# Patient Record
Sex: MALE | Race: BLACK OR AFRICAN AMERICAN | NOT HISPANIC OR LATINO | Employment: UNEMPLOYED | URBAN - METROPOLITAN AREA
[De-identification: names, ages, dates, MRNs, and addresses within clinical notes are randomized per-mention and may not be internally consistent; named-entity substitution may affect disease eponyms.]

---

## 2018-07-11 ENCOUNTER — HOSPITAL ENCOUNTER (EMERGENCY)
Facility: HOSPITAL | Age: 58
Discharge: HOME | End: 2018-07-11
Attending: EMERGENCY MEDICINE | Admitting: EMERGENCY MEDICINE
Payer: MEDICARE

## 2018-07-11 VITALS
TEMPERATURE: 98.1 F | DIASTOLIC BLOOD PRESSURE: 80 MMHG | RESPIRATION RATE: 18 BRPM | WEIGHT: 145.2 LBS | HEART RATE: 92 BPM | OXYGEN SATURATION: 99 % | SYSTOLIC BLOOD PRESSURE: 120 MMHG

## 2018-07-11 DIAGNOSIS — F10.10 ALCOHOL ABUSE: Primary | ICD-10-CM

## 2018-07-11 LAB
ALBUMIN SERPL BCP-MCNC: 3.5 G/DL (ref 3.5–5)
ALP SERPL-CCNC: 140 U/L (ref 46–116)
ALT SERPL W P-5'-P-CCNC: 214 U/L (ref 12–78)
AMPHETAMINES SERPL QL SCN: NEGATIVE
ANION GAP SERPL CALCULATED.3IONS-SCNC: 9 MMOL/L (ref 4–13)
AST SERPL W P-5'-P-CCNC: 381 U/L (ref 5–45)
ATRIAL RATE: 82 BPM
BARBITURATES UR QL: NEGATIVE
BASOPHILS # BLD AUTO: 0.01 THOUSANDS/ΜL (ref 0–0.1)
BASOPHILS NFR BLD AUTO: 0 % (ref 0–1)
BENZODIAZ UR QL: NEGATIVE
BILIRUB SERPL-MCNC: 1.2 MG/DL (ref 0.2–1)
BUN SERPL-MCNC: 23 MG/DL (ref 5–25)
CALCIUM SERPL-MCNC: 9 MG/DL (ref 8.3–10.1)
CHLORIDE SERPL-SCNC: 102 MMOL/L (ref 100–108)
CO2 SERPL-SCNC: 26 MMOL/L (ref 21–32)
COCAINE UR QL: NEGATIVE
CREAT SERPL-MCNC: 1.72 MG/DL (ref 0.6–1.3)
EOSINOPHIL # BLD AUTO: 0.08 THOUSAND/ΜL (ref 0–0.61)
EOSINOPHIL NFR BLD AUTO: 2 % (ref 0–6)
ERYTHROCYTE [DISTWIDTH] IN BLOOD BY AUTOMATED COUNT: 21.5 % (ref 11.6–15.1)
GFR SERPL CREATININE-BSD FRML MDRD: 50 ML/MIN/1.73SQ M
GLUCOSE SERPL-MCNC: 103 MG/DL (ref 65–140)
HCT VFR BLD AUTO: 32.3 % (ref 36.5–49.3)
HGB BLD-MCNC: 10.9 G/DL (ref 12–17)
LYMPHOCYTES # BLD AUTO: 2.79 THOUSANDS/ΜL (ref 0.6–4.47)
LYMPHOCYTES NFR BLD AUTO: 54 % (ref 14–44)
MAGNESIUM SERPL-MCNC: 2 MG/DL (ref 1.6–2.6)
MCH RBC QN AUTO: 29.7 PG (ref 26.8–34.3)
MCHC RBC AUTO-ENTMCNC: 33.7 G/DL (ref 31.4–37.4)
MCV RBC AUTO: 88 FL (ref 82–98)
METHADONE UR QL: NEGATIVE
MONOCYTES # BLD AUTO: 0.39 THOUSAND/ΜL (ref 0.17–1.22)
MONOCYTES NFR BLD AUTO: 8 % (ref 4–12)
NEUTROPHILS # BLD AUTO: 1.88 THOUSANDS/ΜL (ref 1.85–7.62)
NEUTS SEG NFR BLD AUTO: 36 % (ref 43–75)
NRBC BLD AUTO-RTO: 1 /100 WBCS
OPIATES UR QL SCN: NEGATIVE
P AXIS: 68 DEGREES
PCP UR QL: NEGATIVE
PHOSPHATE SERPL-MCNC: 2 MG/DL (ref 2.7–4.5)
PLATELET # BLD AUTO: 172 THOUSANDS/UL (ref 149–390)
PMV BLD AUTO: 9.3 FL (ref 8.9–12.7)
POTASSIUM SERPL-SCNC: 3.1 MMOL/L (ref 3.5–5.3)
PR INTERVAL: 172 MS
PROT SERPL-MCNC: 7.1 G/DL (ref 6.4–8.2)
QRS AXIS: 44 DEGREES
QRSD INTERVAL: 84 MS
QT INTERVAL: 366 MS
QTC INTERVAL: 427 MS
RBC # BLD AUTO: 3.67 MILLION/UL (ref 3.88–5.62)
SODIUM SERPL-SCNC: 137 MMOL/L (ref 136–145)
T WAVE AXIS: 67 DEGREES
THC UR QL: NEGATIVE
TROPONIN I SERPL-MCNC: <0.02 NG/ML
VENTRICULAR RATE: 82 BPM
WBC # BLD AUTO: 5.15 THOUSAND/UL (ref 4.31–10.16)

## 2018-07-11 PROCEDURE — 84484 ASSAY OF TROPONIN QUANT: CPT | Performed by: EMERGENCY MEDICINE

## 2018-07-11 PROCEDURE — 83735 ASSAY OF MAGNESIUM: CPT | Performed by: EMERGENCY MEDICINE

## 2018-07-11 PROCEDURE — 93010 ELECTROCARDIOGRAM REPORT: CPT | Performed by: INTERNAL MEDICINE

## 2018-07-11 PROCEDURE — 80053 COMPREHEN METABOLIC PANEL: CPT | Performed by: EMERGENCY MEDICINE

## 2018-07-11 PROCEDURE — 85025 COMPLETE CBC W/AUTO DIFF WBC: CPT | Performed by: EMERGENCY MEDICINE

## 2018-07-11 PROCEDURE — 80307 DRUG TEST PRSMV CHEM ANLYZR: CPT | Performed by: EMERGENCY MEDICINE

## 2018-07-11 PROCEDURE — 96360 HYDRATION IV INFUSION INIT: CPT

## 2018-07-11 PROCEDURE — 84100 ASSAY OF PHOSPHORUS: CPT | Performed by: EMERGENCY MEDICINE

## 2018-07-11 PROCEDURE — 36415 COLL VENOUS BLD VENIPUNCTURE: CPT | Performed by: EMERGENCY MEDICINE

## 2018-07-11 PROCEDURE — 93005 ELECTROCARDIOGRAM TRACING: CPT

## 2018-07-11 PROCEDURE — 99284 EMERGENCY DEPT VISIT MOD MDM: CPT

## 2018-07-11 PROCEDURE — 96361 HYDRATE IV INFUSION ADD-ON: CPT

## 2018-07-11 RX ORDER — MAGNESIUM GLUCONATE 30 MG(550)
30 TABLET ORAL
COMMUNITY
End: 2018-07-11 | Stop reason: ALTCHOICE

## 2018-07-11 RX ORDER — NAPROXEN 500 MG/1
TABLET ORAL
COMMUNITY
End: 2018-07-11 | Stop reason: ALTCHOICE

## 2018-07-11 RX ORDER — AMITRIPTYLINE HYDROCHLORIDE 50 MG/1
50 TABLET, FILM COATED ORAL
COMMUNITY
End: 2018-07-11 | Stop reason: ALTCHOICE

## 2018-07-11 RX ORDER — LOSARTAN POTASSIUM 100 MG/1
100 TABLET ORAL
COMMUNITY

## 2018-07-11 RX ORDER — TIZANIDINE HYDROCHLORIDE 4 MG/1
4 CAPSULE, GELATIN COATED ORAL
COMMUNITY
End: 2018-07-11 | Stop reason: ALTCHOICE

## 2018-07-11 RX ORDER — FOLIC ACID 1 MG/1
TABLET ORAL
COMMUNITY
End: 2018-07-11 | Stop reason: ALTCHOICE

## 2018-07-11 RX ORDER — CHLORTHALIDONE 25 MG/1
TABLET ORAL
COMMUNITY
End: 2018-07-11 | Stop reason: ALTCHOICE

## 2018-07-11 RX ORDER — GABAPENTIN 800 MG/1
800 TABLET ORAL
COMMUNITY

## 2018-07-11 RX ORDER — OMEPRAZOLE 20 MG/1
20 CAPSULE, DELAYED RELEASE ORAL DAILY
COMMUNITY

## 2018-07-11 RX ORDER — HYDROCODONE BITARTRATE AND ACETAMINOPHEN 5; 325 MG/1; MG/1
TABLET ORAL EVERY 8 HOURS
COMMUNITY
End: 2018-07-11 | Stop reason: ALTCHOICE

## 2018-07-11 RX ORDER — AMLODIPINE BESYLATE 10 MG/1
TABLET ORAL
COMMUNITY

## 2018-07-11 RX ORDER — DIPHENOXYLATE HYDROCHLORIDE AND ATROPINE SULFATE 2.5; .025 MG/1; MG/1
1 TABLET ORAL
COMMUNITY
Start: 2018-06-02 | End: 2018-07-11 | Stop reason: ALTCHOICE

## 2018-07-11 RX ORDER — LIDOCAINE 50 MG/G
PATCH TOPICAL
COMMUNITY
End: 2018-07-11 | Stop reason: ALTCHOICE

## 2018-07-11 RX ORDER — GABAPENTIN 600 MG/1
600 TABLET ORAL 2 TIMES DAILY
COMMUNITY

## 2018-07-11 RX ORDER — BACLOFEN 10 MG/1
TABLET ORAL
COMMUNITY
End: 2018-07-11 | Stop reason: ALTCHOICE

## 2018-07-11 RX ADMIN — DIBASIC SODIUM PHOSPHATE, MONOBASIC POTASSIUM PHOSPHATE AND MONOBASIC SODIUM PHOSPHATE 1 TABLET: 852; 155; 130 TABLET ORAL at 22:25

## 2018-07-11 RX ADMIN — SODIUM CHLORIDE 1000 ML: 0.9 INJECTION, SOLUTION INTRAVENOUS at 19:34

## 2018-07-11 NOTE — ED NOTES
Patient is resting comfortably on stretcher family at bedside       Mackenzie Braden RN  07/11/18 [id number]

## 2018-07-11 NOTE — ED NOTES
BAT  43 [facility], RN  07/11/18 425 [address] [place] [address], RN  07/11/18 9361

## 2018-07-11 NOTE — ED PROVIDER NOTES
History  Chief Complaint   Patient presents with    Drug / Alcohol Assessment     per patient he is trying to quit drinking would like detox  pt relapsed on fathers day and has been drinking a handle of vodka every day  last drink 45min pta  pt also c/o dizziness, poor appetite and heart burn  denies si/hi/ah/vh      62year old male with alcohol abuse hx presents for evaluation of seeking detox  Patient reports he has been drinking approximately 2 bottles of vodka daily for the last month  He tried to cut down today and drank less than 1 bottle but states he feels very lightheaded and nauseous  No chest pain, SOB, vomiting, fevers or abdominal pain  No history of withdrawal seizures  Denies SI/HI/AVH  Prior to Admission Medications   Prescriptions Last Dose Informant Patient Reported? Taking? amLODIPine (NORVASC) 10 mg tablet   Yes Yes   Sig: amlodipine 10 mg tablet   gabapentin (NEURONTIN) 600 MG tablet   Yes Yes   Sig: Take 600 mg by mouth 2 (two) times a day   gabapentin (NEURONTIN) 800 mg tablet   Yes No   Si mg daily at bedtime     losartan (COZAAR) 100 MG tablet   Yes Yes   Sig: Take 100 mg by mouth   omeprazole (PRILOSEC) 20 mg delayed release capsule   Yes Yes   Sig: Take 20 mg by mouth daily      Facility-Administered Medications: None       Past Medical History:   Diagnosis Date    Hypertension        Past Surgical History:   Procedure Laterality Date    ABDOMINAL SURGERY      perforated ulcer       History reviewed  No pertinent family history  I have reviewed and agree with the history as documented  Social History   Substance Use Topics    Smoking status: Current Every Day Smoker     Packs/day: 0 75    Smokeless tobacco: Never Used    Alcohol use Yes      Comment: 1 handle of vodka daily         Review of Systems   Constitutional: Negative for chills, diaphoresis and fever  HENT: Negative for congestion and rhinorrhea  Eyes: Negative for pain and visual disturbance  Respiratory: Negative for cough, shortness of breath and wheezing  Cardiovascular: Negative for chest pain and leg swelling  Gastrointestinal: Positive for nausea  Negative for abdominal pain, diarrhea and vomiting  Genitourinary: Negative for difficulty urinating, dysuria, frequency and urgency  Musculoskeletal: Negative for back pain and neck pain  Skin: Negative for color change and rash  Neurological: Positive for light-headedness  Negative for syncope, numbness and headaches  All other systems reviewed and are negative  Physical Exam  ED Triage Vitals [07/11/18 1834]   Temperature Pulse Respirations Blood Pressure SpO2   98 1 °F (36 7 °C) 92 18 120/80 99 %      Temp Source Heart Rate Source Patient Position - Orthostatic VS BP Location FiO2 (%)   Oral Monitor Sitting Left arm --      Pain Score       4           Orthostatic Vital Signs  Vitals:    07/11/18 1834   BP: 120/80   Pulse: 92   Patient Position - Orthostatic VS: Sitting       Physical Exam   Constitutional: He is oriented to person, place, and time  He appears well-developed and well-nourished  No distress  HENT:   Head: Normocephalic and atraumatic  Eyes: Conjunctivae and EOM are normal    Neck: Normal range of motion  Neck supple  Cardiovascular: Normal rate and regular rhythm  Pulmonary/Chest: Effort normal and breath sounds normal  No respiratory distress  He has no wheezes  He has no rales  Abdominal: Soft  Bowel sounds are normal  There is no tenderness  There is no guarding  Musculoskeletal: Normal range of motion  He exhibits no edema or tenderness  Neurological: He is alert and oriented to person, place, and time  No cranial nerve deficit  Skin: Skin is warm  He is not diaphoretic  No erythema  Psychiatric: He has a normal mood and affect  His behavior is normal    Nursing note and vitals reviewed        ED Medications  Medications   sodium chloride 0 9 % bolus 1,000 mL (0 mL Intravenous Stopped 7/11/18 2224)   potassium-sodium phosphateS (K-PHOS,PHOSPHA 250) -250 mg tablet 1 tablet (1 tablet Oral Given 7/11/18 2225)       Diagnostic Studies  Results Reviewed     Procedure Component Value Units Date/Time    Rapid drug screen, urine [[id number]]  (Normal) Collected:  07/11/18 2015    Lab Status:  Final result Specimen:  Urine from Urine, Clean Catch Updated:  07/11/18 2035     Amph/Meth UR Negative     Barbiturate Ur Negative     Benzodiazepine Urine Negative     Cocaine Urine Negative     Methadone Urine Negative     Opiate Urine Negative     PCP Ur Negative     THC Urine Negative    Narrative:         FOR MEDICAL PURPOSES ONLY  IF CONFIRMATION NEEDED PLEASE CONTACT THE LAB WITHIN 5 DAYS  Drug Screen Cutoff Levels:  AMPHETAMINE/METHAMPHETAMINES  1000 ng/mL  BARBITURATES     200 ng/mL  BENZODIAZEPINES     200 ng/mL  COCAINE      300 ng/mL  METHADONE      300 ng/mL  OPIATES      300 ng/mL  PHENCYCLIDINE     25 ng/mL  THC       50 ng/mL    Comprehensive metabolic panel [[id number]]  (Abnormal) Collected:  07/11/18 1933    Lab Status:  Final result Specimen:  Blood from Arm, Right Updated:  07/11/18 2022     Sodium 137 mmol/L      Potassium 3 1 (L) mmol/L      Chloride 102 mmol/L      CO2 26 mmol/L      Anion Gap 9 mmol/L      BUN 23 mg/dL      Creatinine 1 72 (H) mg/dL      Glucose 103 mg/dL      Calcium 9 0 mg/dL       (H) U/L       (H) U/L      Alkaline Phosphatase 140 (H) U/L      Total Protein 7 1 g/dL      Albumin 3 5 g/dL      Total Bilirubin 1 20 (H) mg/dL      eGFR 50 ml/min/1 73sq m     Narrative:         National Kidney Disease Education Program recommendations are as follows:  GFR calculation is accurate only with a steady state creatinine  Chronic Kidney disease less than 60 ml/min/1 73 sq  meters  Kidney failure less than 15 ml/min/1 73 sq  meters      Magnesium [[id number]]  (Normal) Collected:  07/11/18 1933    Lab Status:  Final result Specimen:  Blood from Arm, Right Updated: 07/11/18 2002     Magnesium 2 0 mg/dL     Phosphorus [[id number]]  (Abnormal) Collected:  07/11/18 1933    Lab Status:  Final result Specimen:  Blood from Arm, Right Updated:  07/11/18 2002     Phosphorus 2 0 (L) mg/dL     Troponin I [[id number]]  (Normal) Collected:  07/11/18 1933    Lab Status:  Final result Specimen:  Blood from Arm, Right Updated:  07/11/18 2000     Troponin I <0 02 ng/mL     CBC and differential [[id number]]  (Abnormal) Resulted:  07/11/18 1946    Lab Status:  Final result Specimen:  Blood Updated:  07/11/18 1946     WBC 5 15 Thousand/uL      RBC 3 67 (L) Million/uL      Hemoglobin 10 9 (L) g/dL      Hematocrit 32 3 (L) %      MCV 88 fL      MCH 29 7 pg      MCHC 33 7 g/dL      RDW 21 5 (H) %      MPV 9 3 fL      Platelets 466 Thousands/uL      nRBC 1 /100 WBCs      Neutrophils Relative 36 (L) %      Lymphocytes Relative 54 (H) %      Monocytes Relative 8 %      Eosinophils Relative 2 %      Basophils Relative 0 %      Neutrophils Absolute 1 88 Thousands/µL      Lymphocytes Absolute 2 79 Thousands/µL      Monocytes Absolute 0 39 Thousand/µL      Eosinophils Absolute 0 08 Thousand/µL      Basophils Absolute 0 01 Thousands/µL                  No orders to display         Procedures  Procedures      Phone Consults  ED Phone Contact    ED Course  ED Course as of Jul 11 2247 Wed Jul 11, 2018 2224 Patient refused                                 MDM  Number of Diagnoses or Management Options  Alcohol abuse:   Diagnosis management comments: 62year old male presenting for alcohol detox  BAT, UDS, medical work up including ekg, trop, labs, administer fluids  Consult crisis/HOST  When HOST arrived, patient refused detox treatment  Will discharge       CritCare Time    Disposition  Final diagnoses:   Alcohol abuse     Time reflects when diagnosis was documented in both MDM as applicable and the Disposition within this note     Time User Action Codes Description Comment    7/11/2018 10:11 PM Rick Jeffrey Add [F10 10] Alcohol abuse       ED Disposition     ED Disposition Condition Comment    Discharge  Karine Dutta discharge to home/self care  Condition at discharge: Stable        Follow-up Information     Follow up With Specialties Details Why Contact Info Additional [address] [facility]  As needed [address] [place] [address]  [id number] 190 [facility] Emergency Department Emergency Medicine  If symptoms worsen [address] [facility]  [phone] [place] ED, [address] [facility] [address]  , [place], [place], [address]          Patient's Medications   Discharge Prescriptions    No medications on file     No discharge procedures on file  ED Provider  Attending physically available and evaluated Karine Dutta I managed the patient along with the ED Attending      Electronically Signed by         Tommy Romero DO  07/11/18 5559

## 2018-07-12 NOTE — ED ATTENDING ATTESTATION
Bryn Kumari MD, saw and evaluated the patient  I have discussed the patient with the resident/non-physician practitioner and agree with the resident's/non-physician practitioner's findings, Plan of Care, and MDM as documented in the resident's/non-physician practitioner's note, except where noted  All available labs and Radiology studies were reviewed  At this point I agree with the current assessment done in the Emergency Department  I have conducted an independent evaluation of this patient a history and physical is as follows:    80-year-old male history of alcoholism presents for evaluation of alcohol withdrawal requesting rehab  Patient complains of poor appetite and heartburn  Denies suicidal or homicidal thoughts, chest pain, shortness breath, cough UR symptoms, dizziness  Ten systems reviewed otherwise negative  On exam no acute distress, lungs normal cardiac normal, abdomen normal, psych normal  Medical decision making;-indigestion, alcohol abuse-will check EKG, labs, IV fluids, treat symptoms reassess    Rehab consult    Critical Care Time  CritCare Time    Procedures

## 2018-07-12 NOTE — ED NOTES
Pt received a call from [address] [facility]) who informed me that she is on her way in to assess pt      [address] [place] [address]

## 2018-07-12 NOTE — DISCHARGE INSTRUCTIONS
Abuse of Alcohol   WHAT YOU SHOULD KNOW:   Alcohol abuse is when you drink large amounts of alcohol often to change your mood or behavior  CARE AGREEMENT:   You have the right to help plan your care  Learn about your health condition and how it may be treated  Discuss treatment options with your caregivers to decide what care you want to receive  You always have the right to refuse treatment  RISKS:   Medicines to treat alcohol abuse may cause vomiting, stress, anxiety, headaches, or dizziness  Alcohol abuse puts you at risk for disease and injury  Alcohol can damage your brain, heart, kidneys, lungs, and liver  The risk of stroke is greater if you have 5 or more drinks each day  You may act out violently when you abuse alcohol  You may harm yourself and others  Risky sexual behavior could lead to sexually transmitted infections  If you are pregnant and drink alcohol, you and your baby are at risk for serious health problems  Alcohol abuse may put you in a coma and may be life-threatening  WHILE YOU ARE HERE:   Informed consent  is a legal document that explains the tests, treatments, or procedures that you may need  Informed consent means you understand what will be done and can make decisions about what you want  You give your permission when you sign the consent form  You can have someone sign this form for you if you are not able to sign it  You have the right to understand your medical care in words you know  Before you sign the consent form, understand the risks and benefits of what will be done  Make sure all your questions are answered  Psychiatric assessment:  Caregivers will ask if you have a history of psychological trauma, such as physical, sexual, or mental abuse  They will ask if you were given the care that you needed  Caregivers will ask you if you have been a victim of a crime or natural disaster, or if you have a serious injury or disease   They will ask you if you have seen other people being harmed, such as in combat  You will be asked if you drink alcohol or use drugs at present or in the past  Caregivers will ask you if you want to hurt or kill yourself or others  How you answer these questions can help caregivers decide on treatment  To help during treatment, caregivers will ask you about such things as how you feel about it and your hobbies and goals  Caregivers will also ask you about the people in your life who support you  Pulse oximeter: A pulse oximeter is a device that measures the amount of oxygen in your blood  A cord with a clip or sticky strip is placed on your finger, ear, or toe  The other end of the cord is hooked to a machine  Never turn the pulse oximeter or alarm off  An alarm will sound if your oxygen level is low or cannot be read  Vital signs:  Caregivers will check your blood pressure, heart rate, breathing rate, and temperature  They will also ask about your pain  These vital signs give caregivers information about your current health  Intake and Output:  Healthcare providers will keep track of the amount of liquid you are getting  They may also need to know how much you are urinating  Ask your healthcare provider how much liquid you should have each day  You may need to increase or decrease the amount of liquid you have each day  Ask healthcare providers if they need to measure or collect your urine before you dispose of it  An IV  is a small tube placed in your vein that is used to give you medicine or liquids  Medicines:   · Sedative: This medicine is given to help you stay calm and relaxed  · Anticonvulsant medicine: This medicine is given to control seizures  Take this medicine exactly as directed  · Antinausea medicine: This medicine may be given to calm your stomach and prevent vomiting  · Glucose: This medicine may be given to increase the amount of sugar in your blood       · Vitamin supplement:  Alcohol can make it hard for your body to absorb enough vitamin B1  You may be given vitamin B1 if you have low levels  It is also given to prevent alcohol related brain damage  You may also need other vitamin supplements  Tests:   · Blood and urine tests:  Samples of your blood or urine are tested for alcohol  Tests can also show signs of liver, kidney, or heart damage caused by alcohol  You may need to have these tests more than once  · Neurologic exam:  This is also called neuro signs, neuro checks, or neuro status  A neurologic exam can show caregivers how well your brain works after an injury or illness  Caregivers will check how your pupils (black dots in the center of each eye) react to light  They may check your memory and how easily you wake up  Your hand grasp and balance may also be tested  · EKG: This test records the electrical activity of your heart  It will be used to check for damage or problems caused by alcohol  · Chest x-ray: This is a picture of your lungs and heart  Healthcare providers may use the x-ray to look for heart damage, injuries, or signs of infection, such as pneumonia  · CT scan: This test is also called a CAT scan  An x-ray machine uses a computer to take pictures of your brain  The pictures may show damage caused by alcohol abuse  You may be given a dye before the pictures are taken to help healthcare providers see the pictures better  Tell the healthcare provider if you have ever had an allergic reaction to contrast dye  Treatment:   · Brief intervention therapy:  A healthcare provider meets with you to discuss ways to control your risky behaviors, such as drinking and driving  This therapy also helps you set goals to decrease the amount of alcohol you drink  · Breathing support:      ¨ You may need extra oxygen  if your blood oxygen level is lower than it should be  You may get oxygen through a mask placed over your nose and mouth or through small tubes placed in your nostrils   Ask your healthcare provider before you take off the mask or oxygen tubing  ¨ A ventilator  is a machine that gives you oxygen and breathes for you when you cannot breathe well on your own  An endotracheal (ET) tube is put into your mouth or nose and attached to the ventilator  You may need a trach if an ET tube cannot be placed  A trach is a tube put through an incision and into your windpipe  © 2014 [employer] is for End User's use only and may not be sold, redistributed or otherwise used for commercial purposes  All illustrations and images included in CareNotes® are the copyrighted property of A D A M [employer]  or M[employer] Leger  The above information is an  only  It is not intended as medical advice for individual conditions or treatments  Talk to your doctor, nurse or pharmacist before following any medical regimen to see if it is safe and effective for you

## 2019-03-28 ENCOUNTER — TRANSCRIBE ORDERS (OUTPATIENT)
Dept: ADMINISTRATIVE | Facility: HOSPITAL | Age: 59
End: 2019-03-28

## 2019-03-28 DIAGNOSIS — IMO0002 LUMP: Primary | ICD-10-CM

## 2019-04-01 ENCOUNTER — TRANSCRIBE ORDERS (OUTPATIENT)
Dept: ADMINISTRATIVE | Facility: HOSPITAL | Age: 59
End: 2019-04-01

## 2019-04-01 ENCOUNTER — HOSPITAL ENCOUNTER (OUTPATIENT)
Dept: ULTRASOUND IMAGING | Facility: HOSPITAL | Age: 59
Discharge: HOME | End: 2019-04-01
Payer: OTHER MISCELLANEOUS

## 2019-04-01 DIAGNOSIS — IMO0002 LUMP: ICD-10-CM

## 2019-04-01 PROCEDURE — 76705 ECHO EXAM OF ABDOMEN: CPT

## 2019-04-02 ENCOUNTER — DOCTOR'S OFFICE (OUTPATIENT)
Dept: URBAN - METROPOLITAN AREA CLINIC 136 | Facility: CLINIC | Age: 59
Setting detail: OPHTHALMOLOGY
End: 2019-04-02
Payer: MEDICAID

## 2019-04-02 DIAGNOSIS — H25.013: ICD-10-CM

## 2019-04-02 DIAGNOSIS — H40.013: ICD-10-CM

## 2019-04-02 PROCEDURE — 92004 COMPRE OPH EXAM NEW PT 1/>: CPT | Performed by: OPHTHALMOLOGY

## 2019-04-02 ASSESSMENT — REFRACTION_AUTOREFRACTION
OD_CYLINDER: -1.00
OD_SPHERE: +1.00
OS_SPHERE: +2.25
OS_CYLINDER: -0.75
OS_AXIS: 017
OD_AXIS: 091

## 2019-04-02 ASSESSMENT — REFRACTION_CURRENTRX
OD_CYLINDER: -1.25
OS_AXIS: 087
OD_OVR_VA: 20/
OD_AXIS: 094
OD_OVR_VA: 20/
OS_ADD: +2.25
OD_SPHERE: +0.75
OD_ADD: +2.25
OD_VPRISM_DIRECTION: BF
OS_SPHERE: PLANO
OS_CYLINDER: -1.50
OS_VPRISM_DIRECTION: BF
OD_OVR_VA: 20/
OS_OVR_VA: 20/

## 2019-04-02 ASSESSMENT — REFRACTION_MANIFEST
OU_VA: 20/
OU_VA: 20/
OS_VA3: 20/
OS_VA3: 20/
OS_VA2: 20/
OD_VA2: 20/
OD_VA3: 20/
OD_VA1: 20/
OS_VA2: 20/
OD_VA1: 20/
OD_VA2: 20/
OS_VA1: 20/
OS_VA1: 20/
OD_VA3: 20/

## 2019-04-02 ASSESSMENT — CONFRONTATIONAL VISUAL FIELD TEST (CVF)
OS_FINDINGS: FULL
OD_FINDINGS: FULL

## 2019-04-02 ASSESSMENT — SPHEQUIV_DERIVED
OD_SPHEQUIV: 0.5
OS_SPHEQUIV: 1.875

## 2019-04-02 ASSESSMENT — VISUAL ACUITY
OS_BCVA: 20/25
OD_BCVA: 20/150

## 2019-04-19 ENCOUNTER — DOCTOR'S OFFICE (OUTPATIENT)
Dept: URBAN - METROPOLITAN AREA CLINIC 136 | Facility: CLINIC | Age: 59
Setting detail: OPHTHALMOLOGY
End: 2019-04-19
Payer: MEDICAID

## 2019-04-19 DIAGNOSIS — H25.011: ICD-10-CM

## 2019-04-19 PROCEDURE — 76519 ECHO EXAM OF EYE: CPT | Performed by: OPHTHALMOLOGY

## 2019-05-23 ENCOUNTER — AMBUL SURGICAL CARE (OUTPATIENT)
Dept: URBAN - METROPOLITAN AREA SURGERY 32 | Facility: SURGERY | Age: 59
Setting detail: OPHTHALMOLOGY
End: 2019-05-23
Payer: MEDICAID

## 2019-05-23 DIAGNOSIS — H25.12: ICD-10-CM

## 2019-05-23 DIAGNOSIS — H25.012: ICD-10-CM

## 2019-05-23 PROCEDURE — 66984 XCAPSL CTRC RMVL W/O ECP: CPT | Performed by: OPHTHALMOLOGY

## 2019-05-23 PROCEDURE — G8907 PT DOC NO EVENTS ON DISCHARG: HCPCS | Performed by: OPHTHALMOLOGY

## 2019-05-23 PROCEDURE — G8918 PT W/O PREOP ORDER IV AB PRO: HCPCS | Performed by: OPHTHALMOLOGY

## 2019-05-24 ENCOUNTER — RX ONLY (RX ONLY)
Age: 59
End: 2019-05-24

## 2019-05-24 ENCOUNTER — DOCTOR'S OFFICE (OUTPATIENT)
Dept: URBAN - METROPOLITAN AREA CLINIC 136 | Facility: CLINIC | Age: 59
Setting detail: OPHTHALMOLOGY
End: 2019-05-24
Payer: MEDICAID

## 2019-05-24 DIAGNOSIS — H27.8: ICD-10-CM

## 2019-05-24 DIAGNOSIS — H27.9: ICD-10-CM

## 2019-05-24 PROCEDURE — 99024 POSTOP FOLLOW-UP VISIT: CPT | Performed by: OPTOMETRIST

## 2019-05-24 ASSESSMENT — REFRACTION_CURRENTRX
OS_CYLINDER: -1.50
OD_OVR_VA: 20/
OD_CYLINDER: -1.25
OD_OVR_VA: 20/
OS_VPRISM_DIRECTION: BF
OS_OVR_VA: 20/
OS_SPHERE: PLANO
OD_ADD: +2.25
OD_AXIS: 094
OS_AXIS: 087
OS_OVR_VA: 20/
OD_SPHERE: +0.75
OS_ADD: +2.25
OD_VPRISM_DIRECTION: BF
OS_OVR_VA: 20/
OD_OVR_VA: 20/

## 2019-05-24 ASSESSMENT — REFRACTION_MANIFEST
OU_VA: 20/
OS_VA3: 20/
OS_VA2: 20/
OS_VA2: 20/
OD_VA3: 20/
OU_VA: 20/
OS_VA1: 20/
OS_VA3: 20/
OD_VA1: 20/
OD_VA1: 20/
OD_VA2: 20/
OD_VA2: 20/
OD_VA3: 20/
OS_VA1: 20/

## 2019-05-24 ASSESSMENT — VISUAL ACUITY
OS_BCVA: 20/25
OD_BCVA: 20/25-2

## 2019-05-24 ASSESSMENT — REFRACTION_AUTOREFRACTION
OD_CYLINDER: -1.00
OS_CYLINDER: -0.75
OS_AXIS: 017
OS_SPHERE: +2.25
OD_AXIS: 091
OD_SPHERE: +1.00

## 2019-05-24 ASSESSMENT — SPHEQUIV_DERIVED
OS_SPHEQUIV: 1.875
OD_SPHEQUIV: 0.5

## 2019-05-30 ENCOUNTER — DOCTOR'S OFFICE (OUTPATIENT)
Dept: URBAN - METROPOLITAN AREA CLINIC 136 | Facility: CLINIC | Age: 59
Setting detail: OPHTHALMOLOGY
End: 2019-05-30

## 2019-05-30 ENCOUNTER — RX ONLY (RX ONLY)
Age: 59
End: 2019-05-30

## 2019-05-30 ENCOUNTER — DOCTOR'S OFFICE (OUTPATIENT)
Dept: URBAN - METROPOLITAN AREA CLINIC 136 | Facility: CLINIC | Age: 59
Setting detail: OPHTHALMOLOGY
End: 2019-05-30
Payer: MEDICAID

## 2019-05-30 DIAGNOSIS — H25.012: ICD-10-CM

## 2019-05-30 DIAGNOSIS — H27.9: ICD-10-CM

## 2019-05-30 DIAGNOSIS — H27.8: ICD-10-CM

## 2019-05-30 PROCEDURE — 99024 POSTOP FOLLOW-UP VISIT: CPT | Performed by: OPTOMETRIST

## 2019-05-30 PROCEDURE — 92136 OPHTHALMIC BIOMETRY: CPT | Performed by: OPHTHALMOLOGY

## 2019-05-30 ASSESSMENT — REFRACTION_MANIFEST
OD_VA1: 20/
OD_VA3: 20/
OD_VA2: 20/
OD_VA3: 20/
OU_VA: 20/
OD_VA2: 20/
OD_VA1: 20/
OS_VA1: 20/
OU_VA: 20/
OS_VA3: 20/
OS_VA1: 20/
OS_VA2: 20/
OS_VA3: 20/
OS_VA2: 20/

## 2019-05-30 ASSESSMENT — REFRACTION_CURRENTRX
OD_AXIS: 094
OS_AXIS: 087
OS_OVR_VA: 20/
OS_OVR_VA: 20/
OD_OVR_VA: 20/
OD_CYLINDER: -1.25
OD_ADD: +2.25
OS_VPRISM_DIRECTION: BF
OS_SPHERE: PLANO
OD_SPHERE: +0.75
OS_ADD: +2.25
OD_OVR_VA: 20/
OD_OVR_VA: 20/
OD_VPRISM_DIRECTION: BF
OS_OVR_VA: 20/
OS_CYLINDER: -1.50

## 2019-05-30 ASSESSMENT — SPHEQUIV_DERIVED
OS_SPHEQUIV: 0.25
OD_SPHEQUIV: 0.5

## 2019-05-30 ASSESSMENT — VISUAL ACUITY
OD_BCVA: 20/20
OS_BCVA: 20/25

## 2019-05-30 ASSESSMENT — REFRACTION_AUTOREFRACTION
OD_AXIS: 091
OD_CYLINDER: -1.00
OD_SPHERE: +1.00
OS_CYLINDER: -0.50
OS_AXIS: 012
OS_SPHERE: +0.50

## 2019-06-10 ENCOUNTER — AMBUL SURGICAL CARE (OUTPATIENT)
Dept: URBAN - METROPOLITAN AREA SURGERY 32 | Facility: SURGERY | Age: 59
Setting detail: OPHTHALMOLOGY
End: 2019-06-10
Payer: MEDICAID

## 2019-06-10 DIAGNOSIS — H25.011: ICD-10-CM

## 2019-06-10 DIAGNOSIS — H25.11: ICD-10-CM

## 2019-06-10 PROCEDURE — G8918 PT W/O PREOP ORDER IV AB PRO: HCPCS | Performed by: OPHTHALMOLOGY

## 2019-06-10 PROCEDURE — G8907 PT DOC NO EVENTS ON DISCHARG: HCPCS | Performed by: OPHTHALMOLOGY

## 2019-06-10 PROCEDURE — 66984 XCAPSL CTRC RMVL W/O ECP: CPT | Performed by: OPHTHALMOLOGY

## 2019-06-11 ENCOUNTER — DOCTOR'S OFFICE (OUTPATIENT)
Dept: URBAN - METROPOLITAN AREA CLINIC 136 | Facility: CLINIC | Age: 59
Setting detail: OPHTHALMOLOGY
End: 2019-06-11

## 2019-06-11 DIAGNOSIS — H27.8: ICD-10-CM

## 2019-06-11 DIAGNOSIS — H27.9: ICD-10-CM

## 2019-06-11 PROBLEM — H25.011 CATARACT; RIGHT EYE: Status: RESOLVED | Noted: 2019-05-24 | Resolved: 2019-06-11

## 2019-06-11 PROCEDURE — 99024 POSTOP FOLLOW-UP VISIT: CPT | Performed by: OPTOMETRIST

## 2019-06-11 ASSESSMENT — REFRACTION_CURRENTRX
OS_OVR_VA: 20/
OS_ADD: +2.25
OD_ADD: +2.25
OS_CYLINDER: -1.50
OD_AXIS: 094
OD_CYLINDER: -1.25
OS_SPHERE: PLANO
OD_SPHERE: +0.75
OS_OVR_VA: 20/
OS_VPRISM_DIRECTION: BF
OS_OVR_VA: 20/
OD_OVR_VA: 20/
OS_AXIS: 087
OD_VPRISM_DIRECTION: BF
OD_OVR_VA: 20/
OD_OVR_VA: 20/

## 2019-06-11 ASSESSMENT — SPHEQUIV_DERIVED: OS_SPHEQUIV: -0.25

## 2019-06-11 ASSESSMENT — REFRACTION_MANIFEST
OD_VA1: 20/
OS_VA2: 20/
OS_VA2: 20/
OD_VA1: 20/
OS_VA1: 20/
OD_VA3: 20/
OS_VA3: 20/
OU_VA: 20/
OS_VA1: 20/
OD_VA2: 20/
OD_VA2: 20/
OU_VA: 20/
OS_VA3: 20/
OD_VA3: 20/

## 2019-06-11 ASSESSMENT — REFRACTION_AUTOREFRACTION
OS_CYLINDER: -1.50
OS_AXIS: 175
OD_SPHERE: +0.50
OD_CYLINDER: SPHERE
OS_SPHERE: +0.50

## 2019-06-11 ASSESSMENT — VISUAL ACUITY
OD_BCVA: 20/20-1
OS_BCVA: 20/20-1

## 2019-06-20 ENCOUNTER — DOCTOR'S OFFICE (OUTPATIENT)
Dept: URBAN - METROPOLITAN AREA CLINIC 136 | Facility: CLINIC | Age: 59
Setting detail: OPHTHALMOLOGY
End: 2019-06-20

## 2019-06-20 DIAGNOSIS — H27.8: ICD-10-CM

## 2019-06-20 DIAGNOSIS — H27.9: ICD-10-CM

## 2019-06-20 PROCEDURE — 99024 POSTOP FOLLOW-UP VISIT: CPT | Performed by: OPTOMETRIST

## 2019-06-20 ASSESSMENT — REFRACTION_MANIFEST
OU_VA: 20/
OS_VA1: 20/
OS_VA3: 20/
OS_VA1: 20/
OS_VA2: 20/
OD_VA1: 20/
OD_VA2: 20/
OS_VA3: 20/
OD_VA2: 20/
OD_VA3: 20/
OD_VA1: 20/
OU_VA: 20/
OS_VA2: 20/
OD_VA3: 20/

## 2019-06-20 ASSESSMENT — REFRACTION_CURRENTRX
OD_SPHERE: +0.75
OD_VPRISM_DIRECTION: BF
OD_CYLINDER: -1.25
OS_OVR_VA: 20/
OD_AXIS: 094
OD_OVR_VA: 20/
OS_OVR_VA: 20/
OD_ADD: +2.25
OS_SPHERE: PLANO
OD_OVR_VA: 20/
OD_OVR_VA: 20/
OS_ADD: +2.25
OS_AXIS: 087
OS_OVR_VA: 20/
OS_CYLINDER: -1.50
OS_VPRISM_DIRECTION: BF

## 2019-06-20 ASSESSMENT — VISUAL ACUITY
OS_BCVA: 20/20-2
OD_BCVA: 20/20-1

## 2019-06-20 ASSESSMENT — REFRACTION_AUTOREFRACTION
OS_SPHERE: +0.50
OD_AXIS: 071
OD_CYLINDER: -0.75
OD_SPHERE: +0.50
OS_CYLINDER: -1.50
OS_AXIS: 175

## 2019-06-20 ASSESSMENT — SPHEQUIV_DERIVED
OD_SPHEQUIV: 0.125
OS_SPHEQUIV: -0.25

## 2019-07-11 ENCOUNTER — DOCTOR'S OFFICE (OUTPATIENT)
Dept: URBAN - METROPOLITAN AREA CLINIC 136 | Facility: CLINIC | Age: 59
Setting detail: OPHTHALMOLOGY
End: 2019-07-11

## 2019-07-11 DIAGNOSIS — H27.8: ICD-10-CM

## 2019-07-11 DIAGNOSIS — H27.9: ICD-10-CM

## 2019-07-11 PROCEDURE — 99024 POSTOP FOLLOW-UP VISIT: CPT | Performed by: OPTOMETRIST

## 2019-07-11 ASSESSMENT — REFRACTION_CURRENTRX
OS_OVR_VA: 20/
OD_AXIS: 094
OS_ADD: +2.25
OS_OVR_VA: 20/
OS_OVR_VA: 20/
OD_SPHERE: +0.75
OS_CYLINDER: -1.50
OS_VPRISM_DIRECTION: BF
OD_OVR_VA: 20/
OD_ADD: +2.25
OS_SPHERE: PLANO
OD_OVR_VA: 20/
OD_OVR_VA: 20/
OD_CYLINDER: -1.25
OD_VPRISM_DIRECTION: BF
OS_AXIS: 087

## 2019-07-11 ASSESSMENT — REFRACTION_MANIFEST
OU_VA: 20/
OU_VA: 20/
OS_ADD: +2.50
OD_VA3: 20/
OD_CYLINDER: SPH
OD_ADD: +2.50
OS_VA3: 20/
OS_VA1: 20/
OD_VA3: 20/
OS_VA2: 20/
OS_VA1: 20/
OD_VA1: 20/
OS_SPHERE: PLANO
OD_VA2: 20/
OS_VA2: 20/
OD_SPHERE: PLANO
OD_VA1: 20/
OD_VA2: 20/
OS_VA3: 20/
OS_CYLINDER: SPH

## 2019-07-11 ASSESSMENT — REFRACTION_AUTOREFRACTION
OS_CYLINDER: -1.00
OS_AXIS: 177
OD_SPHERE: +0.50
OS_SPHERE: +0.50

## 2019-07-11 ASSESSMENT — SPHEQUIV_DERIVED: OS_SPHEQUIV: 0

## 2019-07-11 ASSESSMENT — VISUAL ACUITY
OS_BCVA: 20/25-2
OD_BCVA: 20/20-1

## 2019-10-23 ENCOUNTER — DOCTOR'S OFFICE (OUTPATIENT)
Dept: URBAN - METROPOLITAN AREA CLINIC 136 | Facility: CLINIC | Age: 59
Setting detail: OPHTHALMOLOGY
End: 2019-10-23
Payer: MEDICAID

## 2019-10-23 DIAGNOSIS — H16.223: ICD-10-CM

## 2019-10-23 DIAGNOSIS — H40.013: ICD-10-CM

## 2019-10-23 DIAGNOSIS — H27.9: ICD-10-CM

## 2019-10-23 PROBLEM — H16.221 KERATOCONJUNCTIVITIS SICCA; RIGHT EYE, LEFT EYE: Status: ACTIVE | Noted: 2019-10-23

## 2019-10-23 PROBLEM — H16.222 KERATOCONJUNCTIVITIS SICCA; RIGHT EYE, LEFT EYE: Status: ACTIVE | Noted: 2019-10-23

## 2019-10-23 PROBLEM — H27.8 PSEUDOPHAKIA: Status: ACTIVE | Noted: 2019-05-24

## 2019-10-23 PROCEDURE — 92083 EXTENDED VISUAL FIELD XM: CPT | Performed by: OPHTHALMOLOGY

## 2019-10-23 PROCEDURE — 92014 COMPRE OPH EXAM EST PT 1/>: CPT | Performed by: OPHTHALMOLOGY

## 2019-10-23 ASSESSMENT — REFRACTION_CURRENTRX
OS_SPHERE: PLANO
OS_CYLINDER: -1.50
OS_OVR_VA: 20/
OD_AXIS: 094
OD_SPHERE: +0.75
OD_VPRISM_DIRECTION: BF
OS_VPRISM_DIRECTION: BF
OS_OVR_VA: 20/
OS_AXIS: 087
OS_OVR_VA: 20/
OD_ADD: +2.25
OD_CYLINDER: -1.25
OD_OVR_VA: 20/
OD_OVR_VA: 20/
OS_ADD: +2.25
OD_OVR_VA: 20/

## 2019-10-23 ASSESSMENT — REFRACTION_MANIFEST
OS_ADD: +2.50
OS_VA2: 20/
OS_VA3: 20/
OD_VA1: 20/
OD_CYLINDER: SPH
OS_VA2: 20/
OD_VA3: 20/
OD_VA2: 20/
OD_VA2: 20/
OD_ADD: +2.50
OD_VA1: 20/
OS_CYLINDER: SPH
OU_VA: 20/
OS_VA1: 20/
OS_SPHERE: PLANO
OS_VA1: 20/
OD_SPHERE: PLANO
OS_VA3: 20/
OD_VA3: 20/
OU_VA: 20/

## 2019-10-23 ASSESSMENT — REFRACTION_AUTOREFRACTION
OD_SPHERE: +0.50
OS_AXIS: 177
OS_SPHERE: +0.50
OS_CYLINDER: -1.00

## 2019-10-23 ASSESSMENT — SPHEQUIV_DERIVED: OS_SPHEQUIV: 0

## 2019-10-23 ASSESSMENT — VISUAL ACUITY
OD_BCVA: 20/20
OS_BCVA: 20/20

## 2019-10-23 ASSESSMENT — CONFRONTATIONAL VISUAL FIELD TEST (CVF)
OD_FINDINGS: FULL
OS_FINDINGS: FULL

## 2021-05-26 ENCOUNTER — TRANSCRIBE ORDERS (OUTPATIENT)
Dept: ADMINISTRATIVE | Facility: HOSPITAL | Age: 61
End: 2021-05-26

## 2021-05-26 DIAGNOSIS — Z12.2 ENCOUNTER FOR SCREENING FOR MALIGNANT NEOPLASM OF RESPIRATORY ORGANS: Primary | ICD-10-CM
